# Patient Record
Sex: FEMALE | Race: WHITE | Employment: FULL TIME | URBAN - METROPOLITAN AREA
[De-identification: names, ages, dates, MRNs, and addresses within clinical notes are randomized per-mention and may not be internally consistent; named-entity substitution may affect disease eponyms.]

---

## 2017-09-11 ENCOUNTER — OFFICE VISIT (OUTPATIENT)
Dept: ORTHOPEDIC SURGERY | Age: 52
End: 2017-09-11

## 2017-09-11 VITALS
BODY MASS INDEX: 27.6 KG/M2 | WEIGHT: 150 LBS | HEIGHT: 62 IN | HEART RATE: 88 BPM | DIASTOLIC BLOOD PRESSURE: 89 MMHG | SYSTOLIC BLOOD PRESSURE: 131 MMHG

## 2017-09-11 DIAGNOSIS — S83.241A ACUTE MEDIAL MENISCUS TEAR, RIGHT, INITIAL ENCOUNTER: ICD-10-CM

## 2017-09-11 DIAGNOSIS — M25.561 ACUTE PAIN OF RIGHT KNEE: Primary | ICD-10-CM

## 2017-09-11 DIAGNOSIS — M22.41 CHONDROMALACIA PATELLA, RIGHT: ICD-10-CM

## 2017-09-11 PROCEDURE — 99213 OFFICE O/P EST LOW 20 MIN: CPT | Performed by: ORTHOPAEDIC SURGERY

## 2017-09-11 RX ORDER — DICYCLOMINE HYDROCHLORIDE 10 MG/1
10 CAPSULE ORAL
COMMUNITY
Start: 2016-06-21 | End: 2022-01-10

## 2017-09-11 RX ORDER — LORAZEPAM 0.5 MG/1
0.5 TABLET ORAL
COMMUNITY
Start: 2017-07-13 | End: 2022-01-10

## 2017-09-11 ASSESSMENT — ENCOUNTER SYMPTOMS
COUGH: 1
SINUS PRESSURE: 1
BACK PAIN: 0
SHORTNESS OF BREATH: 1
WHEEZING: 1
SORE THROAT: 1
CHEST TIGHTNESS: 1
EYES NEGATIVE: 1
GASTROINTESTINAL NEGATIVE: 1

## 2017-09-14 ENCOUNTER — OFFICE VISIT (OUTPATIENT)
Dept: ORTHOPEDIC SURGERY | Age: 52
End: 2017-09-14

## 2017-09-14 VITALS — HEART RATE: 104 BPM | SYSTOLIC BLOOD PRESSURE: 133 MMHG | DIASTOLIC BLOOD PRESSURE: 89 MMHG

## 2017-09-14 DIAGNOSIS — M25.561 ACUTE PAIN OF RIGHT KNEE: Primary | ICD-10-CM

## 2017-09-14 DIAGNOSIS — M76.31 IT BAND SYNDROME, RIGHT: ICD-10-CM

## 2017-09-14 DIAGNOSIS — M22.41 CHONDROMALACIA PATELLA, RIGHT: ICD-10-CM

## 2017-09-14 DIAGNOSIS — M70.51 PES ANSERINUS BURSITIS OF RIGHT KNEE: ICD-10-CM

## 2017-09-14 PROCEDURE — 20610 DRAIN/INJ JOINT/BURSA W/O US: CPT | Performed by: ORTHOPAEDIC SURGERY

## 2017-09-14 PROCEDURE — 99213 OFFICE O/P EST LOW 20 MIN: CPT | Performed by: ORTHOPAEDIC SURGERY

## 2019-12-31 ENCOUNTER — OFFICE VISIT (OUTPATIENT)
Dept: ORTHOPEDIC SURGERY | Age: 54
End: 2019-12-31
Payer: COMMERCIAL

## 2019-12-31 ENCOUNTER — TELEPHONE (OUTPATIENT)
Dept: ORTHOPEDIC SURGERY | Age: 54
End: 2019-12-31

## 2019-12-31 VITALS
HEART RATE: 88 BPM | SYSTOLIC BLOOD PRESSURE: 116 MMHG | DIASTOLIC BLOOD PRESSURE: 78 MMHG | WEIGHT: 150 LBS | BODY MASS INDEX: 26.58 KG/M2 | HEIGHT: 63 IN

## 2019-12-31 DIAGNOSIS — M70.52 PES ANSERINUS BURSITIS OF LEFT KNEE: ICD-10-CM

## 2019-12-31 DIAGNOSIS — M25.561 ACUTE PAIN OF RIGHT KNEE: Primary | ICD-10-CM

## 2019-12-31 DIAGNOSIS — M25.562 ACUTE PAIN OF LEFT KNEE: ICD-10-CM

## 2019-12-31 DIAGNOSIS — S83.521A SPRAIN OF POSTERIOR CRUCIATE LIGAMENT OF RIGHT KNEE, INITIAL ENCOUNTER: ICD-10-CM

## 2019-12-31 PROCEDURE — L1812 KO ELASTIC W/JOINTS PRE OTS: HCPCS | Performed by: ORTHOPAEDIC SURGERY

## 2019-12-31 PROCEDURE — 20610 DRAIN/INJ JOINT/BURSA W/O US: CPT | Performed by: ORTHOPAEDIC SURGERY

## 2019-12-31 PROCEDURE — 99214 OFFICE O/P EST MOD 30 MIN: CPT | Performed by: ORTHOPAEDIC SURGERY

## 2019-12-31 RX ORDER — METHYLPREDNISOLONE ACETATE 40 MG/ML
80 INJECTION, SUSPENSION INTRA-ARTICULAR; INTRALESIONAL; INTRAMUSCULAR; SOFT TISSUE ONCE
Status: COMPLETED | OUTPATIENT
Start: 2019-12-31 | End: 2019-12-31

## 2019-12-31 RX ORDER — LIDOCAINE HYDROCHLORIDE 10 MG/ML
1 INJECTION, SOLUTION INFILTRATION; PERINEURAL ONCE
Status: COMPLETED | OUTPATIENT
Start: 2019-12-31 | End: 2019-12-31

## 2019-12-31 RX ADMIN — LIDOCAINE HYDROCHLORIDE 1 ML: 10 INJECTION, SOLUTION INFILTRATION; PERINEURAL at 08:21

## 2019-12-31 RX ADMIN — METHYLPREDNISOLONE ACETATE 80 MG: 40 INJECTION, SUSPENSION INTRA-ARTICULAR; INTRALESIONAL; INTRAMUSCULAR; SOFT TISSUE at 08:22

## 2020-01-09 ENCOUNTER — OFFICE VISIT (OUTPATIENT)
Dept: ORTHOPEDIC SURGERY | Age: 55
End: 2020-01-09
Payer: COMMERCIAL

## 2020-01-09 VITALS
HEIGHT: 63 IN | RESPIRATION RATE: 12 BRPM | DIASTOLIC BLOOD PRESSURE: 78 MMHG | SYSTOLIC BLOOD PRESSURE: 111 MMHG | BODY MASS INDEX: 26.58 KG/M2 | WEIGHT: 150 LBS | HEART RATE: 87 BPM

## 2020-01-09 PROCEDURE — 99212 OFFICE O/P EST SF 10 MIN: CPT | Performed by: ORTHOPAEDIC SURGERY

## 2020-01-09 NOTE — PROGRESS NOTES
Gilbert John returns today to follow-up her right knee. Pain is 3 or 4 out of 10. Kneeling is very painful. The brace is helpful. Patient's medications, allergies, past medical, surgical, social and family histories were reviewed and updated as appropriate. Relevant review of systems reviewed. -General Exam:    Vitals: Blood pressure 111/78, pulse 87, resp. rate 12, height 5' 3\" (1.6 m), weight 150 lb (68 kg). Constitutional: Patient is adequately groomed with no evidence of malnutrition  Mental Status: The patient is oriented to time, place and person. The patient's mood and affect are appropriate. Right knee still has 1+ drop back with PCL testing and mild to moderate patellofemoral crepitation but no drainable effusion or collateral ligament instability. MRI right knee is reviewed. It demonstrates:  HISTORY:  Acute pain, PCL injury.       TECHNICAL FACTORS:  Long- and short-axis fat- and water-weighted images were performed.       COMPARISON:  09/12/2017.       FINDINGS:  Extensor mechanism intact.  Laterally tilted patella.  Cartilage    fibrillation/fraying patellar apex and lateral patella.       Medial meniscus intact.  Scarred MCL.       Lateral meniscus, LCL intact.       ACL intact.  High grade PCL injury.  Full thickness midsubstance tear favored over high grade    partial thickness tear.  Capsulitis.  No osseous contusion or fracture.       Small ganglion cyst anteromedial meniscus recess, larger when compared to the previous exam.       CONCLUSION:   1. High grade midsubstance PCL injury.  Full thickness nonretracted tear favored over high    grade partial thickness tear. 2. Larger ganglion anteromedial meniscus recess when compared to previous exam.   3. Mild patellofemoral arthropathy.  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 4. Capsulitis. 5. Please see above.      I reviewed these findings on the report and the images with the

## 2020-01-14 ENCOUNTER — EVALUATION (OUTPATIENT)
Dept: PHYSICAL THERAPY | Age: 55
End: 2020-01-14
Payer: COMMERCIAL

## 2020-01-14 PROCEDURE — 97161 PT EVAL LOW COMPLEX 20 MIN: CPT | Performed by: PHYSICAL THERAPIST

## 2020-01-14 PROCEDURE — 97016 VASOPNEUMATIC DEVICE THERAPY: CPT | Performed by: PHYSICAL THERAPIST

## 2020-01-14 PROCEDURE — 97110 THERAPEUTIC EXERCISES: CPT | Performed by: PHYSICAL THERAPIST

## 2020-01-14 PROCEDURE — 97112 NEUROMUSCULAR REEDUCATION: CPT | Performed by: PHYSICAL THERAPIST

## 2020-01-14 NOTE — FLOWSHEET NOTE
[facility]   Phone: [phone]    Fax: [phone]      Physical Therapy Treatment Note/ Progress Report:           Date:  2020    Patient Name:  Jessica Madrigal    :  1965  MRN: <[id number]>  Restrictions/Precautions:    Medical/Treatment Diagnosis Information:  · Diagnosis: R PCL injury/tear  ·    Insurance/Certification information:  PT Insurance Information: Humana  Physician Information:  Referring Practitioner: Dr. Carol Donis  Has the plan of care been signed (Y/N):        []  Yes  [x]  No     Date of Patient follow up with Physician: 2 months      Is this a Progress Report:     [x]  Yes  []  No        If Yes:  Date Range for reporting period:  Beginnin2020  Endin2020    Progress report will be due (10 Rx or 30 days whichever is less):        Recertification will be due (POC Duration  / 90 days whichever is less): 2020         Visit # Insurance Allowable Auth Required   1 60 []  Yes [x]  No        Functional Scale: LEFS: 29/80, 63.75   Date assessed:  2020      Latex Allergy:  [x]NO      []YES  Preferred Language for Healthcare:   [x]English       []other:      Pain level:  3/10 currently, 9-10/10 with kneeling     SUBJECTIVE:  See eval    OBJECTIVE: See eval     Flexibility L R Comment:  2020   Hamstring Mod deficit Mod deficit     Gastroc Mod deficit Mod deficit     ITB         Quad                                 ROM PROM AROM Comment:  2020     L R L R     Flexion     123° 100°*     Extension     0° -1°                                       Strength R L Comment:  2020   Quad 3+/5* 4/5*     Hamstring 3+/5* 4/5     Gastroc                                Girth:  2020 L R   Mid Patella 34 cm 36 cm   Suprapatellar 37.5 cm 39 cm         Special Test Results/Comment:  2020   Skip Negative Bilaterally   Crepitus Negative Bilaterally   Valgus Laxity Negative Bilaterally   Varus Laxity Negative Progressing: [] Met: [] Not Met: [] Adjusted    Therapist goals for Patient:   Short Term Goals: To be achieved in: 2 weeks  1. Independent in HEP and progression per patient tolerance, in order to prevent re-injury. [] Progressing: [] Met: [] Not Met: [] Adjusted  2. Patient will have a decrease in pain to facilitate improvement in movement, function, and ADLs as indicated by Functional Deficits. [] Progressing: [] Met: [] Not Met: [] Adjusted    Long Term Goals: To be achieved in: 6 weeks  1. Disability index score of 15% or less for the LEFS to assist with reaching prior level of function. [] Progressing: [] Met: [] Not Met: [] Adjusted  2. Patient will demonstrate increased R knee flexion AROM to >120° to allow for proper joint functioning as indicated by patients Functional Deficits. [] Progressing: [] Met: [] Not Met: [] Adjusted  3. Patient will demonstrate an increase in B quad and HS strength by a half grade or greater to allow for proper functional mobility as indicated by patients Functional Deficits. [] Progressing: [] Met: [] Not Met: [] Adjusted  4. Patient will return to all functional activities without increased symptoms or restriction. [] Progressing: [] Met: [] Not Met: [] Adjusted  5. Patient will be able to ascend/descend 1 flight of stairs without pain or limitation so that she may return to PLOF for all ADLs. [] Progressing: [] Met: [] Not Met: [] Adjusted      Overall Progression Towards Functional goals/ Treatment Progress Update:  [] Patient is progressing as expected towards functional goals listed. [] Progression is slowed due to complexities/Impairments listed. [] Progression has been slowed due to co-morbidities.   [x] Plan just implemented, too soon to assess goals progression <30days   [] Goals require adjustment due to lack of progress  [] Patient is not progressing as expected and requires additional follow up with physician  [] Other    Prognosis for POC: [x] Good [] Fair  [] Poor      Patient requires continued skilled intervention: [x] Yes  [] No    Treatment/Activity Tolerance:  [x] Patient able to complete treatment  [] Patient limited by fatigue  [] Patient limited by pain    [] Patient limited by other medical complications  [] Other:           PLAN: See eval  [] Continue per plan of care [] Alter current plan   [x] Plan of care initiated [] Hold pending MD visit [] Discharge      Electronically signed by:  Stephen Mondragon PT, DPT, RUDY Torres PT license: [id number]  [place] PT license: [id number]      Note: If patient does not return for scheduled/ recommended follow up visits, this note will serve as a discharge from care along with most recent update on progress.

## 2020-01-14 NOTE — PLAN OF CARE
standing.     Relevant Medical History:Hx of B knee bursitis, Hx of OA, Hx of L ankle fx/repair, HX of wrist fx  Functional Scale/Score: LEFS: 29/80, 63.75% limitation    Pain Scale: 3/10 currently, 9-10/10 with kneeling  Easing factors: rest, ibuprofen, ice  Provocative factors: prolonged sitting, prolonged standing, kneeling, ascending/descending stairs (descending is worse)     Type: []Constant   [x]Intermittent  []Radiating []Localized []other:     Numbness/Tingling: patient denies    Occupation/School: Patient is employed full time as a     Living Status/Prior Level of Function: Independent with ADLs and IADLs,   Patient has approximately 15-20 stairs    OBJECTIVE:     Flexibility L R Comment:  1/14/2020   Hamstring Mod deficit Mod deficit    Gastroc Mod deficit Mod deficit    ITB      Quad                ROM PROM AROM Comment:  1/14/2020    L R L R    Flexion   123° 100°*    Extension   0° -1°                        Strength R L Comment:  1/14/2020   Quad 3+/5* 4/5*    Hamstring 3+/5* 4/5    Gastroc                    Girth:  1/14/2020 L R   Mid Patella 34 cm 36 cm   Suprapatellar 37.5 cm 39 cm       Special Test Results/Comment:  1/14/2020   Skip Negative Bilaterally   Crepitus Negative Bilaterally   Valgus Laxity Negative Bilaterally   Varus Laxity Negative Bilaterally   Anterior Drawer Negative Bilaterally   Posterior Drawer Positive on R           Reflexes/Sensation:    []Dermatomes/Myotomes intact    []Reflexes equal and normal bilaterally   [x]Other: sensation intact to light touch    Joint mobility:    []Normal    [x]Hypo   []Hyper    Palpation: +2-3 TTP along R knee lateral joint line and posterior/medial R knee    Functional Mobility/Transfers: pain and limitation with functional walking, sitting, and ascending/descending stairs    Posture: forward flexed posture    Bandages/Dressings/Incisions: N/A    Gait: (include devices/WB status) decreased stance time on R LE, decreased gait speed    Orthopedic Special Tests: see above                       [x] Patient history, allergies, meds reviewed. Medical chart reviewed. See intake form. Review Of Systems (ROS):  [x]Performed Review of systems (Integumentary, CardioPulmonary, Neurological) by intake and observation. Intake form has been scanned into medical record. Patient has been instructed to contact their primary care physician regarding ROS issues if not already being addressed at this time.       Co-morbidities/Complexities (which will affect course of rehabilitation):   []None           Arthritic conditions   []Rheumatoid arthritis (M05.9)  [x]Osteoarthritis (M19.91)   Cardiovascular conditions   []Hypertension (I10)  []Hyperlipidemia (E78.5)  []Angina pectoris (I20)  []Atherosclerosis (I70)  []CVA Musculoskeletal conditions   []Disc pathology   []Congenital spine pathologies   []Prior surgical intervention  []Osteoporosis (M81.8)  []Osteopenia (M85.8)   Endocrine conditions   []Hypothyroid (E03.9)  []Hyperthyroid Gastrointestinal conditions   []Constipation (K14.98)   Metabolic conditions   []Morbid obesity (E66.01)  []Diabetes type 1(E10.65) or 2 (E11.65)   []Neuropathy (G60.9)     Pulmonary conditions   []Asthma (J45)  []Coughing   []COPD (J44.9)   Psychological Disorders  []Anxiety (F41.9)  []Depression (F32.9)   []Other:   [x]Other:   Hx of wrist fx, Hx of L ankle fx and repair, Hx of hysterectomy, hx of 3 c-sections       Barriers to/and or personal factors that will affect rehab potential:              []Age  []Sex    []Smoker              [x]Motivation/Lack of Motivation: Patient demonstrates high motivation                        []Co-Morbidities              []Cognitive Function, education/learning barriers              []Environmental, home barriers              []profession/work barriers  [x]past PT/medical experience: Patient has had prior skilled PT experience and demonstrates good understanding f Prognosis/Rehab Potential:      []Excellent   [x]Good    []Fair   []Poor    Tolerance of evaluation/treatment:    []Excellent   [x]Good    []Fair   []Poor    Physical Therapy Evaluation Complexity Justification  [x] A history of present problem with:  [] no personal factors and/or comorbidities that impact the plan of care;  [x]1-2 personal factors and/or comorbidities that impact the plan of care  []3 personal factors and/or comorbidities that impact the plan of care  [x] An examination of body systems using standardized tests and measures addressing any of the following: body structures and functions (impairments), activity limitations, and/or participation restrictions;:  [] a total of 1-2 or more elements   [x] a total of 3 or more elements   [] a total of 4 or more elements   [x] A clinical presentation with:  [x] stable and/or uncomplicated characteristics   [] evolving clinical presentation with changing characteristics  [] unstable and unpredictable characteristics;   [x] Clinical decision making of [x] low, [] moderate, [] high complexity using standardized patient assessment instrument and/or measurable assessment of functional outcome. [x] EVAL (LOW) 20859 (typically 20 minutes face-to-face)  [] EVAL (MOD) 12929 (typically 30 minutes face-to-face)  [] EVAL (HIGH) 82458 (typically 45 minutes face-to-face)  [] RE-EVAL     PLAN:  Frequency/Duration:  1-2 days per week for 4-6 Weeks:  Interventions:  [x]  Therapeutic exercise including: strength training, ROM, for Lower extremity and core   [x]  NMR activation and proprioception for LE, Glutes and Core   [x]  Manual therapy as indicated for LE, Hip and spine to include: Dry Needling/IASTM, STM, PROM, Gr I-IV mobilizations, manipulation.    [x] Modalities as needed that may include: thermal agents, E-stim, Biofeedback, US, iontophoresis as indicated  [x] Patient education on joint protection, postural re-education, activity modification, progression of HEP.    HEP instruction: Patient returned proper demonstration of HEP. Issued patient written program clearly stating sets/reps/sessions per day. Written program was scanned into media section of medical record. (see scanned forms)    GOALS:  Patient stated goal: able to kneel without pain and return to the gym  [] Progressing: [] Met: [] Not Met: [] Adjusted    Therapist goals for Patient:   Short Term Goals: To be achieved in: 2 weeks  1. Independent in HEP and progression per patient tolerance, in order to prevent re-injury. [] Progressing: [] Met: [] Not Met: [] Adjusted  2. Patient will have a decrease in pain to facilitate improvement in movement, function, and ADLs as indicated by Functional Deficits. [] Progressing: [] Met: [] Not Met: [] Adjusted    Long Term Goals: To be achieved in: 6 weeks  1. Disability index score of 15% or less for the LEFS to assist with reaching prior level of function. [] Progressing: [] Met: [] Not Met: [] Adjusted  2. Patient will demonstrate increased R knee flexion AROM to >120° to allow for proper joint functioning as indicated by patients Functional Deficits. [] Progressing: [] Met: [] Not Met: [] Adjusted  3. Patient will demonstrate an increase in B quad and HS strength by a half grade or greater to allow for proper functional mobility as indicated by patients Functional Deficits. [] Progressing: [] Met: [] Not Met: [] Adjusted  4. Patient will return to all functional activities without increased symptoms or restriction. [] Progressing: [] Met: [] Not Met: [] Adjusted  5. Patient will be able to ascend/descend 1 flight of stairs without pain or limitation so that she may return to PLOF for all ADLs.   [] Progressing: [] Met: [] Not Met: [] Adjusted     Electronically signed by:  Yee Harris, PT, DPT, OMT-C      [place] PT license: [id number]  [place] PT license: [id number]

## 2020-01-21 ENCOUNTER — TREATMENT (OUTPATIENT)
Dept: PHYSICAL THERAPY | Age: 55
End: 2020-01-21
Payer: COMMERCIAL

## 2020-01-21 PROCEDURE — 97110 THERAPEUTIC EXERCISES: CPT | Performed by: PHYSICAL THERAPIST

## 2020-01-21 PROCEDURE — 97530 THERAPEUTIC ACTIVITIES: CPT | Performed by: PHYSICAL THERAPIST

## 2020-01-21 PROCEDURE — 97112 NEUROMUSCULAR REEDUCATION: CPT | Performed by: PHYSICAL THERAPIST

## 2020-01-21 PROCEDURE — 97016 VASOPNEUMATIC DEVICE THERAPY: CPT | Performed by: PHYSICAL THERAPIST

## 2020-01-21 NOTE — FLOWSHEET NOTE
[facility]   Phone: [phone]    Fax: [phone]      Physical Therapy Treatment Note/ Progress Report:           Date:  2020    Patient Name:  Minerva Emery    :  1965  MRN: <[id number]>  Restrictions/Precautions:    Medical/Treatment Diagnosis Information:  · Diagnosis: R PCL injury/tear     Insurance/Certification information:  PT Insurance Information: Humana  Physician Information:  Referring Practitioner: Dr. Jono Madera  Has the plan of care been signed (Y/N):        [x]  Yes  []  No     Date of Patient follow up with Physician: 2 months      Is this a Progress Report:     []  Yes  [x]  No        If Yes:  Date Range for reporting period:  Beginnin2020  Endin2020    Progress report will be due (10 Rx or 30 days whichever is less):        Recertification will be due (POC Duration  / 90 days whichever is less): 2020         Visit # Insurance Allowable Auth Required   2 60 []  Yes [x]  No        Functional Scale: LEFS: 29/80, 63.75   Date assessed:  2020      Latex Allergy:  [x]NO      []YES  Preferred Language for Healthcare:   [x]English       []other:      Pain level:  4/10      SUBJECTIVE:  Patient reports that R knee feels tight and achy today. She states that she has noticed a slight increase in soreness with the cold over the last few days. She notes that she has noticed with wearing the brace, she has noticed some increased pain in her R hip. She notes that over the weekend she also had several instances of feeling like she had \"needles\" in the lateral side of her knee when under the sheet and in the shower.      OBJECTIVE: See eval     Flexibility L R Comment:  2020   Hamstring Mod deficit Mod deficit     Gastroc Mod deficit Mod deficit     ITB         Quad                                 ROM PROM AROM Comment:  2020     L R L R     Flexion     123° 100°*     Extension     0° -1°                         30 minutes face-to-face)  [] EVAL (HIGH) 52438 (typically 45 minutes face-to-face)  [] RE-EVAL     [x] MT(74595) x  2   [] IONTO  [x] NMR (56350) x  1   [x] VASO  [] Manual (50178) x     [] Other:  [x] TA x 1      [] Mech Traction (72753)  [] ES(attended) (80915)      [] ES (un) (69243):         ASSESSMENT:  Patient demonstrates good carry over with form for all exercises today, requiring only minimal VCs from PT. She demonstrates improving ROM of R knee, though she still reports stiffness but that it feels good to stretch it. TKE, hip abduction SLR, and calf raises added to patient's program today, requiring moderate VCs for form. Plan to progress activity per patient tolerance and MD recommendations. PT instructed patient to add abduction SLR to HEP. GOALS:  Patient stated goal: able to kneel without pain and return to the gym  [] Progressing: [] Met: [] Not Met: [] Adjusted    Therapist goals for Patient:   Short Term Goals: To be achieved in: 2 weeks  1. Independent in HEP and progression per patient tolerance, in order to prevent re-injury. [] Progressing: [] Met: [] Not Met: [] Adjusted  2. Patient will have a decrease in pain to facilitate improvement in movement, function, and ADLs as indicated by Functional Deficits. [] Progressing: [] Met: [] Not Met: [] Adjusted    Long Term Goals: To be achieved in: 6 weeks  1. Disability index score of 15% or less for the LEFS to assist with reaching prior level of function. [] Progressing: [] Met: [] Not Met: [] Adjusted  2. Patient will demonstrate increased R knee flexion AROM to >120° to allow for proper joint functioning as indicated by patients Functional Deficits. [] Progressing: [] Met: [] Not Met: [] Adjusted  3. Patient will demonstrate an increase in B quad and HS strength by a half grade or greater to allow for proper functional mobility as indicated by patients Functional Deficits. [] Progressing: [] Met: [] Not Met: [] Adjusted  4.  Patient will return to all functional activities without increased symptoms or restriction. [] Progressing: [] Met: [] Not Met: [] Adjusted  5. Patient will be able to ascend/descend 1 flight of stairs without pain or limitation so that she may return to PLOF for all ADLs. [] Progressing: [] Met: [] Not Met: [] Adjusted      Overall Progression Towards Functional goals/ Treatment Progress Update:  [] Patient is progressing as expected towards functional goals listed. [] Progression is slowed due to complexities/Impairments listed. [] Progression has been slowed due to co-morbidities. [x] Plan just implemented, too soon to assess goals progression <30days   [] Goals require adjustment due to lack of progress  [] Patient is not progressing as expected and requires additional follow up with physician  [] Other    Prognosis for POC: [x] Good [] Fair  [] Poor      Patient requires continued skilled intervention: [x] Yes  [] No    Treatment/Activity Tolerance:  [x] Patient able to complete treatment  [] Patient limited by fatigue  [] Patient limited by pain    [] Patient limited by other medical complications  [] Other:           PLAN: See eval  [x] Continue per plan of care [] Alter current plan   [] Plan of care initiated [] Hold pending MD visit [] Discharge      Electronically signed by:  Robert Clark, PT, DPT, OMT-C      [place] PT license: [id number]  [place] PT license: [id number]      Note: If patient does not return for scheduled/ recommended follow up visits, this note will serve as a discharge from care along with most recent update on progress.

## 2020-01-23 ENCOUNTER — TREATMENT (OUTPATIENT)
Dept: PHYSICAL THERAPY | Age: 55
End: 2020-01-23
Payer: COMMERCIAL

## 2020-01-23 PROCEDURE — 97112 NEUROMUSCULAR REEDUCATION: CPT | Performed by: PHYSICAL THERAPIST

## 2020-01-23 PROCEDURE — 97110 THERAPEUTIC EXERCISES: CPT | Performed by: PHYSICAL THERAPIST

## 2020-01-23 PROCEDURE — 97530 THERAPEUTIC ACTIVITIES: CPT | Performed by: PHYSICAL THERAPIST

## 2020-01-23 PROCEDURE — 97016 VASOPNEUMATIC DEVICE THERAPY: CPT | Performed by: PHYSICAL THERAPIST

## 2020-01-23 NOTE — FLOWSHEET NOTE
Results/Comment:  1/14/2020   Skip Negative Bilaterally   Crepitus Negative Bilaterally   Valgus Laxity Negative Bilaterally   Varus Laxity Negative Bilaterally   Anterior Drawer Negative Bilaterally   Posterior Drawer Positive on R          RESTRICTIONS/PRECAUTIONS: Hx of R PCL tear    Exercises/Interventions:     Therapeutic Ex (13322) Sets/sec Reps Notes/CUES   BIKE      TREADMILL            STRETCHING      Towel Pull Calf 20\" x5    Inclined Calf      Hamstrings 20\" x5    Quads      Hip Flexors      ITB      Adductors            ROM      Passive      Active      Weight Hangs      Sheet Pulls 5\" x10    Ankle Pumps      ERMI            STRENGTHENING      Quad Sets 10\" x10    Ham Sets      Hip ADD Sets BS 10\" x10    SLR Flexion 2 x10    SLR Abduction 2 x10    SLR Prone      SLR Adduction      SLR+            Clam shells 2 x10                PRE Machines      Knee Extension      Knee Flexion      Leg Press            CKC      Calf Raises 3 x10    Mini Squats      Wall Sits      Step ups Lvl 1 x10    TKE Bonilla, 3-5\" x20    Bridge 3\" x20                Manual Intervention (57753)      Patellar Mobs      Femoral Tibial mobs      STM      Scar Massage      Foam Roll            NMR re-education (56625)   CUES NEEDED/comments   Biodex Balance      Tandem Balance 20\" x3 Add NPV   SLB      Rebounder      BOSU                              Therapeutic Activity (51838)      Core Training      Swiss Fabian Net Activities      Monster Walks      TRX training                        Therapeutic Exercise and NMR EXR  [x] (49354) Provided verbal/tactile cueing for activities related to strengthening, flexibility, endurance, ROM  for improvements in scapular, scapulothoracic and UE control with self care, reaching, carrying, lifting, house/yardwork, driving/computer work.     [x] (94044) Provided verbal/tactile cueing for activities related to improving balance, coordination, kinesthetic sense, posture, motor skill, proprioception  to pain or limitation so that she may return to PLOF for all ADLs. [] Progressing: [] Met: [] Not Met: [] Adjusted      Overall Progression Towards Functional goals/ Treatment Progress Update:  [] Patient is progressing as expected towards functional goals listed. [] Progression is slowed due to complexities/Impairments listed. [] Progression has been slowed due to co-morbidities. [x] Plan just implemented, too soon to assess goals progression <30days   [] Goals require adjustment due to lack of progress  [] Patient is not progressing as expected and requires additional follow up with physician  [] Other    Prognosis for POC: [x] Good [] Fair  [] Poor      Patient requires continued skilled intervention: [x] Yes  [] No    Treatment/Activity Tolerance:  [x] Patient able to complete treatment  [] Patient limited by fatigue  [] Patient limited by pain    [] Patient limited by other medical complications  [] Other:           PLAN: See eval  [x] Continue per plan of care [] Alter current plan   [] Plan of care initiated [] Hold pending MD visit [] Discharge      Electronically signed by:  Em Hartley PT, DPT, OMT-C      [id number] [facility] PT license: [id number]  [place] PT license: [id number]      Note: If patient does not return for scheduled/ recommended follow up visits, this note will serve as a discharge from care along with most recent update on progress.

## 2020-01-28 ENCOUNTER — TREATMENT (OUTPATIENT)
Dept: PHYSICAL THERAPY | Age: 55
End: 2020-01-28
Payer: COMMERCIAL

## 2020-01-28 PROCEDURE — 97110 THERAPEUTIC EXERCISES: CPT | Performed by: PHYSICAL THERAPIST

## 2020-01-28 PROCEDURE — 97016 VASOPNEUMATIC DEVICE THERAPY: CPT | Performed by: PHYSICAL THERAPIST

## 2020-01-28 PROCEDURE — 97530 THERAPEUTIC ACTIVITIES: CPT | Performed by: PHYSICAL THERAPIST

## 2020-01-28 PROCEDURE — 97112 NEUROMUSCULAR REEDUCATION: CPT | Performed by: PHYSICAL THERAPIST

## 2020-01-28 NOTE — FLOWSHEET NOTE
[facility]   Phone: [phone]    Fax: [phone]      Physical Therapy Treatment Note/ Progress Report:           Date:  2020    Patient Name:  Penny Neri    :  1965  MRN: <[id number]>  Restrictions/Precautions:    Medical/Treatment Diagnosis Information:  · Diagnosis: R PCL injury/tear     Insurance/Certification information:  PT Insurance Information: Hum[employer]  Physician Information:  Referring Practitioner: Dr. Cinda Garcia  Has the plan of care been signed (Y/N):        [x]  Yes  []  No     Date of Patient follow up with Physician: 2 months      Is this a Progress Report:     []  Yes  [x]  No        If Yes:  Date Range for reporting period:  Beginnin2020  Endin2020    Progress report will be due (10 Rx or 30 days whichever is less):        Recertification will be due (POC Duration  / 90 days whichever is less): 2020         Visit # Insurance Allowable Auth Required   4 60 []  Yes [x]  No        Functional Scale: LEFS: 29/80, 63.75   Date assessed:  2020      Latex Allergy:  [x]NO      []YES  Preferred Language for Healthcare:   [x]English       []other:      Pain level:  2-4/10      SUBJECTIVE:  Patient reports that stairs are improving. She notes that pain has decreased with descending stairs and relying less on UE assistance. She notes that kneeling also seems to be improving with decreased pain. Patient notes that she is note feeling well this morning, as she has a sinus infection and is on antibiotics. She notes that her endurance is down slightly today.     OBJECTIVE: See eval     Flexibility L R Comment:  2020   Hamstring Mod deficit Mod deficit     Gastroc Mod deficit Mod deficit     ITB         Quad                                 ROM PROM AROM Comment:  2020     L R L R     Flexion     123° 100°*     Extension     0° -1°                                       Strength R L Comment:  2020   Quad 3+/5* 4/5*     Hamstring 3+/5* 4/5     Gastroc                                Girth:  1/14/2020 L R   Mid Patella 34 cm 36 cm   Suprapatellar 37.5 cm 39 cm         Special Test Results/Comment:  1/14/2020   Skip Negative Bilaterally   Crepitus Negative Bilaterally   Valgus Laxity Negative Bilaterally   Varus Laxity Negative Bilaterally   Anterior Drawer Negative Bilaterally   Posterior Drawer Positive on R          RESTRICTIONS/PRECAUTIONS: Hx of R PCL tear    Exercises/Interventions:     Therapeutic Ex (30697) Sets/sec Reps Notes/CUES   BIKE      TREADMILL            STRETCHING      Towel Pull Calf 20\" x5    Inclined Calf 20\" x5    Hamstrings 20\" x5    Quads      Hip Flexors      ITB      Adductors            ROM      Passive      Active      Weight Hangs      Sheet Pulls 5\" x10    Ankle Pumps      ERMI            STRENGTHENING      Quad Sets 10\" x10    Ham Sets      Hip ADD Sets BS 10\" x10    SLR Flexion 2 x10 Add wt NPV   SLR Abduction 2 x10 Add wt NPV   SLR Prone      SLR Adduction      SLR+            Clam shells 2 x10 Add resistance NPV               PRE Machines      Knee Extension      Knee Flexion      Leg Press            CKC      Calf Raises 3 x10    Mini Squats      Wall Sits      Step ups Lvl 1, 2 x10    TKE Bonilla, 3-5\" x20    Bridge 3\" x20                Manual Intervention (99229)      Patellar Mobs      Femoral Tibial mobs      STM      Scar Massage      Foam Roll            NMR re-education (34074)   CUES NEEDED/comments   Biodex Balance      Tandem Balance 20\" x3 Add NPV   SLB      Rebounder      BOSU                              Therapeutic Activity (08713)      Core Training      [employer] Activities      Monster Walks      TRX training                        Therapeutic Exercise and NMR EXR  [x] (99236) Provided verbal/tactile cueing for activities related to strengthening, flexibility, endurance, ROM  for improvements in scapular, scapulothoracic and UE control with self care, reaching, carrying,

## 2020-02-04 ENCOUNTER — TREATMENT (OUTPATIENT)
Dept: PHYSICAL THERAPY | Age: 55
End: 2020-02-04
Payer: COMMERCIAL

## 2020-02-04 PROCEDURE — 97110 THERAPEUTIC EXERCISES: CPT | Performed by: PHYSICAL THERAPIST

## 2020-02-04 PROCEDURE — 97016 VASOPNEUMATIC DEVICE THERAPY: CPT | Performed by: PHYSICAL THERAPIST

## 2020-02-04 PROCEDURE — 97530 THERAPEUTIC ACTIVITIES: CPT | Performed by: PHYSICAL THERAPIST

## 2020-02-04 PROCEDURE — 97112 NEUROMUSCULAR REEDUCATION: CPT | Performed by: PHYSICAL THERAPIST

## 2020-02-04 NOTE — FLOWSHEET NOTE
[facility]   Phone: [phone]    Fax: [phone]      Physical Therapy Treatment Note/ Progress Report:           Date:  2020    Patient Name:  Maylon Sacks    :  1965  MRN: <[id number]>  Restrictions/Precautions:    Medical/Treatment Diagnosis Information:  · Diagnosis: R PCL injury/tear     Insurance/Certification information:  PT Insurance Information: Humana  Physician Information:  Referring Practitioner: Dr. Vijaya Alfonso  Has the plan of care been signed (Y/N):        [x]  Yes  []  No     Date of Patient follow up with Physician: 2 months      Is this a Progress Report:     []  Yes  [x]  No        If Yes:  Date Range for reporting period:  Beginnin2020  Endin2020    Progress report will be due (10 Rx or 30 days whichever is less): 3/67/1381       Recertification will be due (POC Duration  / 90 days whichever is less): 2020         Visit # Insurance Allowable Auth Required   5 60 []  Yes [x]  No        Functional Scale: LEFS: 29/80, 63.75   Date assessed:  2020      Latex Allergy:  [x]NO      []YES  Preferred Language for Healthcare:   [x]English       []other:      Pain level:  2-4/10      SUBJECTIVE:  Patient reports that she had flare up in symptoms over the weekend. She notes that she was  \"pretty sick\" in the middle of last week and slept the majority of Tuesday and Wednesday. She notes that she had some minor increased pain along patellar tendon over the weekend and increased sense of giving way that she hadn't had in a while.     OBJECTIVE: See eval     Flexibility L R Comment:  2020   Hamstring Mod deficit Mod deficit     Gastroc Mod deficit Mod deficit     ITB         Quad                                 ROM PROM AROM Comment:  2020     L R L R     Flexion     123° 100°*     Extension     0° -1°                                       Strength R L Comment:  2020   Quad 3+/5* 4/5*     Hamstring 3+/5* 4/5   Gastroc                                Girth:  1/14/2020 L R   Mid Patella 34 cm 36 cm   Suprapatellar 37.5 cm 39 cm         Special Test Results/Comment:  1/14/2020   Skip Negative Bilaterally   Crepitus Negative Bilaterally   Valgus Laxity Negative Bilaterally   Varus Laxity Negative Bilaterally   Anterior Drawer Negative Bilaterally   Posterior Drawer Positive on R          RESTRICTIONS/PRECAUTIONS: Hx of R PCL tear    Exercises/Interventions:     Therapeutic Ex (96973) Sets/sec Reps Notes/CUES   BIKE      TREADMILL            STRETCHING      Towel Pull Calf 20\" x5    Inclined Calf 20\" x5    Hamstrings 20\" x5    Quads      Hip Flexors      ITB      Adductors            ROM      Passive      Active      Weight Hangs      Sheet Pulls 5\" x10    Ankle Pumps      ERMI            STRENGTHENING      Quad Sets 10\" x10    Ham Sets      Hip ADD Sets BS 10\" x10    SLR Flexion 1#, 2 x10    SLR Abduction 1#, 2 x10    SLR Prone      SLR Adduction      SLR+            Clam shells 2 x10 Add resistance NPV               PRE Machines      Knee Extension      Knee Flexion      Leg Press            CKC      Calf Raises 3 x10    Mini Squats      Wall Sits      Step ups Lvl 1, 2 x10    TKE Bonilla, 3-5\" x20    Bridge 3\" x20                Manual Intervention (64215)      Patellar Mobs      Femoral Tibial mobs      STM      Scar Massage      Foam Roll            NMR re-education (58013)   CUES NEEDED/comments   Biodex Balance      Tandem Balance 20\" x3 Add NPV   SLB      Rebounder      BOSU                              Therapeutic Activity (15796)      Core Training      Swiss [place] Activities      Monster Walks      TRX training                        Therapeutic Exercise and NMR EXR  [x] (26765) Provided verbal/tactile cueing for activities related to strengthening, flexibility, endurance, ROM  for improvements in scapular, scapulothoracic and UE control with self care, reaching, carrying, lifting, house/yardwork, driving/computer work.    [x] (49749) Provided verbal/tactile cueing for activities related to improving balance, coordination, kinesthetic sense, posture, motor skill, proprioception  to assist with  scapular, scapulothoracic and UE control with self care, reaching, carrying, lifting, house/yardwork, driving/computer work. Therapeutic Activities:    [] (97280 or 92994) Provided verbal/tactile cueing for activities related to improving balance, coordination, kinesthetic sense, posture, motor skill, proprioception and motor activation to allow for proper function of scapular, scapulothoracic and UE control with self care, carrying, lifting, driving/computer work. Home Exercise Program:    [x] (85865) Reviewed/Progressed HEP activities related to strengthening, flexibility, endurance, ROM of scapular, scapulothoracic and UE control with self care, reaching, carrying, lifting, house/yardwork, driving/computer work  [] (09653) Reviewed/Progressed HEP activities related to improving balance, coordination, kinesthetic sense, posture, motor skill, proprioception of scapular, scapulothoracic and UE control with self care, reaching, carrying, lifting, house/yardwork, driving/computer work      Manual Treatments:  PROM / STM / Oscillations-Mobs:  G-I, II, III, IV (PA's, Inf., Post.)  [] (11163) Provided manual therapy to mobilize soft tissue/joints of cervical/CT, scapular GHJ and UE for the purpose of modulating pain, promoting relaxation,  increasing ROM, reducing/eliminating soft tissue swelling/inflammation/restriction, improving soft tissue extensibility and allowing for proper ROM for normal function with self care, reaching, carrying, lifting, house/yardwork, driving/computer work    Modalities:     [x] GAME READY (VASO)- for significant edema, swelling, pain control.  To B knees x15'    Charges:  Timed Code Treatment Minutes: 56   Total Treatment Minutes: 66      [] EVAL (LOW) 77657 (typically 20 minutes face-to-face)  [] YAHAIRA (MOD) 05741 (typically 30 minutes face-to-face)  [] EVAL (HIGH) 58280 (typically 45 minutes face-to-face)  [] RE-EVAL     [x] UM(95633) x  1   [] IONTO  [x] NMR (91393) x  1   [x] VASO  [] Manual (11340) x     [] Other:  [x] TA x 2      [] Mech Traction (54190)  [] ES(attended) (21982)      [] ES (un) (38720):         ASSESSMENT:  Weight added to SLR activities today with no reports of increased pain from patient, though moderate fatigue is noted. All other progressions held today secondary to patient's report of flare up and limited activity over this past week from being sick. Plan to progress activity per patient tolerance with emphasis on quad strengthening and function. GOALS:  Patient stated goal: able to kneel without pain and return to the gym  [] Progressing: [] Met: [] Not Met: [] Adjusted    Therapist goals for Patient:   Short Term Goals: To be achieved in: 2 weeks  1. Independent in HEP and progression per patient tolerance, in order to prevent re-injury. [] Progressing: [] Met: [] Not Met: [] Adjusted  2. Patient will have a decrease in pain to facilitate improvement in movement, function, and ADLs as indicated by Functional Deficits. [] Progressing: [] Met: [] Not Met: [] Adjusted    Long Term Goals: To be achieved in: 6 weeks  1. Disability index score of 15% or less for the LEFS to assist with reaching prior level of function. [] Progressing: [] Met: [] Not Met: [] Adjusted  2. Patient will demonstrate increased R knee flexion AROM to >120° to allow for proper joint functioning as indicated by patients Functional Deficits. [] Progressing: [] Met: [] Not Met: [] Adjusted  3. Patient will demonstrate an increase in B quad and HS strength by a half grade or greater to allow for proper functional mobility as indicated by patients Functional Deficits. [] Progressing: [] Met: [] Not Met: [] Adjusted  4.  Patient will return to all functional activities without increased symptoms or

## 2020-02-06 ENCOUNTER — TREATMENT (OUTPATIENT)
Dept: PHYSICAL THERAPY | Age: 55
End: 2020-02-06
Payer: COMMERCIAL

## 2020-02-06 PROCEDURE — 97016 VASOPNEUMATIC DEVICE THERAPY: CPT | Performed by: PHYSICAL THERAPIST

## 2020-02-06 PROCEDURE — 97112 NEUROMUSCULAR REEDUCATION: CPT | Performed by: PHYSICAL THERAPIST

## 2020-02-06 PROCEDURE — 97530 THERAPEUTIC ACTIVITIES: CPT | Performed by: PHYSICAL THERAPIST

## 2020-02-06 PROCEDURE — 97110 THERAPEUTIC EXERCISES: CPT | Performed by: PHYSICAL THERAPIST

## 2020-02-06 NOTE — PROGRESS NOTES
[facility]   Phone: [phone]    Fax: [phone]      Physical Therapy Treatment Note/ Progress Report:           Date:  2020    Patient Name:  Ydaira Neely    :  1965  MRN: <[id number]>  Restrictions/Precautions:    Medical/Treatment Diagnosis Information:  · Diagnosis: R PCL injury  ·    Insurance/Certification information:  PT Insurance Information: Humana  Physician Information:  Referring Practitioner: Dr. Shreyas Whitlock  Has the plan of care been signed (Y/N):        []  Yes  [x]  No     Date of Patient follow up with Physician: 2 months        Is this a Progress Report:        []? Yes                    [x]? No          If Yes:  Date Range for reporting period:  Beginnin2020  Endin2020     Progress report will be due (10 Rx or 30 days whichever is less):          Recertification will be due (POC Duration  / 90 days whichever is less): 2020            Visit # Insurance Allowable Auth Required   6 60 []? Yes     [x]? No                Functional Scale: LEFS: 29/80, 63.75                                 Date assessed:  2020                    Latex Allergy:  [x]? NO      []? YES  Preferred Language for Healthcare:   [x]? English       []? other:        Pain level:  2-4/10            SUBJECTIVE:  Patient reports slight improvements in R knee since last session, but notes that it is still not where it was at the beginning of last week.  She notes that she tried to go without her brace yesterday morning, but continued to have feelings of instability so she put the brace back on.     OBJECTIVE: See eval     Flexibility L R Comment:  2020   Hamstring Mod deficit Mod deficit     Gastroc Mod deficit Mod deficit     ITB         Quad                                       ROM PROM AROM Comment:  2020     L R L R     Flexion     123° 100°*     Extension     0° -1°                                       Strength R L house/yardwork, driving/computer work     Modalities:     [x]? GAME READY (VASO)- for significant edema, swelling, pain control. To B knees x15'     Charges:  Timed Code Treatment Minutes: 46   Total Treatment Minutes: 66      []? EVAL (LOW) 65559 (typically 20 minutes face-to-face)  []? EVAL (MOD) 53418 (typically 30 minutes face-to-face)  []? EVAL (HIGH) 49172 (typically 45 minutes face-to-face)  []? RE-EVAL      [x]? NK(13209) x  1                   []? IONTO  [x]? NMR (86471) x  1              [x]? VASO  []? Manual (92415) x              []? Other:  [x]? TA x 1                                 []? Mech Traction (97286)  []? ES(attended) (47028)                    []? ES (un) (17888):           ASSESSMENT:  Tandem balance added to patient's program today to focus on stability of R knee. She is also able to progress to level 2 on step up activity today with no reports of increased pain in the R knee. PT and patient discussed use of brace when she will be on her feet consistently. Plan to progress strength and functional activities per patient tolerance.        GOALS:  Patient stated goal: able to kneel without pain and return to the gym  []? Progressing: []? Met: []? Not Met: []? Adjusted     Therapist goals for Patient:   Short Term Goals: To be achieved in: 2 weeks  1. Independent in HEP and progression per patient tolerance, in order to prevent re-injury. []? Progressing: []? Met: []? Not Met: []? Adjusted  2. Patient will have a decrease in pain to facilitate improvement in movement, function, and ADLs as indicated by Functional Deficits. []? Progressing: []? Met: []? Not Met: []? Adjusted     Long Term Goals: To be achieved in: 6 weeks  1. Disability index score of 15% or less for the LEFS to assist with reaching prior level of function. []? Progressing: []? Met: []? Not Met: []? Adjusted  2.  Patient will demonstrate increased R knee flexion AROM to >120° to allow for proper joint functioning as indicated by patients Functional Deficits. []? Progressing: []? Met: []? Not Met: []? Adjusted  3. Patient will demonstrate an increase in B quad and HS strength by a half grade or greater to allow for proper functional mobility as indicated by patients Functional Deficits. []? Progressing: []? Met: []? Not Met: []? Adjusted  4. Patient will return to all functional activities without increased symptoms or restriction. []? Progressing: []? Met: []? Not Met: []? Adjusted  5. Patient will be able to ascend/descend 1 flight of stairs without pain or limitation so that she may return to [facility] for all ADLs.  []? Progressing: []? Met: []? Not Met: []? Adjusted        Overall Progression Towards Functional goals/ Treatment Progress Update:  []? Patient is progressing as expected towards functional goals listed. []? Progression is slowed due to complexities/Impairments listed. []? Progression has been slowed due to co-morbidities. [x]? Plan just implemented, too soon to assess goals progression <30days   []? Goals require adjustment due to lack of progress  []? Patient is not progressing as expected and requires additional follow up with physician  []? Other     Prognosis for POC:     [x]? Good          []? Fair             []? Poor        Patient requires continued skilled intervention:         [x]? Yes             []? No     Treatment/Activity Tolerance:  [x]? Patient able to complete treatment  []? Patient limited by fatigue  []? Patient limited by pain                     []? Patient limited by other medical complications  []? Other:             PLAN: See eval  [x]? Continue per plan of care  []? Alter current plan   []? Plan of care initiated          []? Hold pending MD visit       []?  Discharge         Electronically signed by:  Miguel Angel Negron, PT, DPT, OMT-C      [id number] [facility] [employer] license: [id number]  [place] PT license: [id number]      Note: If patient does not return for scheduled/ recommended follow up visits, this note will serve as a discharge from care along with most recent update on progress.

## 2020-02-11 ENCOUNTER — TREATMENT (OUTPATIENT)
Dept: PHYSICAL THERAPY | Age: 55
End: 2020-02-11
Payer: COMMERCIAL

## 2020-02-11 ENCOUNTER — TELEPHONE (OUTPATIENT)
Dept: ORTHOPEDIC SURGERY | Age: 55
End: 2020-02-11

## 2020-02-11 PROCEDURE — 97530 THERAPEUTIC ACTIVITIES: CPT | Performed by: PHYSICAL THERAPIST

## 2020-02-11 PROCEDURE — 97110 THERAPEUTIC EXERCISES: CPT | Performed by: PHYSICAL THERAPIST

## 2020-02-11 PROCEDURE — 97112 NEUROMUSCULAR REEDUCATION: CPT | Performed by: PHYSICAL THERAPIST

## 2020-02-11 PROCEDURE — 97016 VASOPNEUMATIC DEVICE THERAPY: CPT | Performed by: PHYSICAL THERAPIST

## 2020-02-11 NOTE — TELEPHONE ENCOUNTER
Spoke with patient. Dr. Jean-Paul Shah would like to see her 2 months from last appointment. Scheduled appointment for patient to follow up 3-10-20 at 7:45.

## 2020-02-11 NOTE — PLAN OF CARE
[facility]   Phone: [phone]    Fax: [phone]     Physical Therapy Re-Certification Plan of Care    Dear Dr. Saundra Carrington,    We had the pleasure of treating the following patient for physical therapy services at [address] [place] [address]. A summary of our findings can be found in the updated assessment below. This includes our plan of care. If you have any questions or concerns regarding these findings, please do not hesitate to contact me at the office phone number checked above. Thank you for the referral.     Physician Signature:________________________________Date:__________________  By signing above (or electronic signature), therapists plan is approved by physician      Overall Response to Treatment:   [x]Patient is responding well to treatment and improvement is noted with regards  to goals   []Patient should continue to improve in reasonable time if they continue HEP   []Patient has plateaued and is no longer responding to skilled PT intervention    []Patient is getting worse and would benefit from return to referring MD   []Patient unable to adhere to initial POC   []Other:         Physical Therapy Treatment Note/ Progress Report:           Date:  2020    Patient Name:  Gilbert John    :  1965  MRN: <[id number]>  Restrictions/Precautions:    Medical/Treatment Diagnosis Information:  · Diagnosis: R PCL injury     Insurance/Certification information:  PT Insurance Information: Humana  Physician Information:  Referring Practitioner: Dr. Saundra Carrington  Has the plan of care been signed (Y/N):        []  Yes  [x]  No     Date of Patient follow up with Physician: 2 months        Is this a Progress Report:        [x]?   Yes                    []?  No          If Yes:  Date Range for reporting period:  Beginnin2020  (update NPV)  Endin2020     Progress report will be due (10 Rx or 30 days whichever is less): 3/11/2020 Ankle Pumps         ERMI                   STRENGTHENING         Quad Sets 10\" x10     Ham Sets         Hip ADD Sets BS 10\" x10     SLR Flexion 1#, 2 x10     SLR Abduction 1#, 2 x10     SLR Prone         SLR Adduction         SLR+                   Clam shells Blue, 2 x10                        PRE Machines         Knee Extension         Knee Flexion         Leg Press                   CKC         Calf Raises 3 x10     Mini Squats         Wall Sits         Step ups Lvl 3, 2 x10     Step up and over lvl 2,  x10    TKE Bonilla, 3-5\" x20     Bridge 3\" x20                         Manual Intervention (54087)         Patellar Mobs         Femoral Tibial mobs         STM         Scar Massage         Foam Roll                   NMR re-education (59853)     CUES NEEDED/comments   Biodex Balance         Tandem Balance 20\" x3 On airex   SLB         Rebounder         BOSU                                                 Therapeutic Activity (01772)         Core Training         [employer] Activities         Monster Walks         TRX training                                      Therapeutic Exercise and NMR EXR  [x]? (59791) Provided verbal/tactile cueing for activities related to strengthening, flexibility, endurance, ROM  for improvements in scapular, scapulothoracic and UE control with self care, reaching, carrying, lifting, house/yardwork, driving/computer work.     [x]? (92074) Provided verbal/tactile cueing for activities related to improving balance, coordination, kinesthetic sense, posture, motor skill, proprioception  to assist with  scapular, scapulothoracic and UE control with self care, reaching, carrying, lifting, house/yardwork, driving/computer work.     Therapeutic Activities:    []? (40482 or 96708) Provided verbal/tactile cueing for activities related to improving balance, coordination, kinesthetic sense, posture, motor skill, proprioception and motor activation to allow for proper function of scapular, scapulothoracic and UE control with self care, carrying, lifting, driving/computer work.      Home Exercise Program:    [x]? (75731) Reviewed/Progressed HEP activities related to strengthening, flexibility, endurance, ROM of scapular, scapulothoracic and UE control with self care, reaching, carrying, lifting, house/yardwork, driving/computer work  []? (54277) Reviewed/Progressed HEP activities related to improving balance, coordination, kinesthetic sense, posture, motor skill, proprioception of scapular, scapulothoracic and UE control with self care, reaching, carrying, lifting, house/yardwork, driving/computer work       Manual Treatments:  PROM / STM / Oscillations-Mobs:  G-I, II, III, IV (PA's, Inf., Post.)  []? (36439) Provided manual therapy to mobilize soft tissue/joints of cervical/CT, scapular GHJ and UE for the purpose of modulating pain, promoting relaxation,  increasing ROM, reducing/eliminating soft tissue swelling/inflammation/restriction, improving soft tissue extensibility and allowing for proper ROM for normal function with self care, reaching, carrying, lifting, house/yardwork, driving/computer work     Modalities:     [x]? GAME READY (VASO)- for significant edema, swelling, pain control. To B knees x10'     Charges:  Timed Code Treatment Minutes: 53   Total Treatment Minutes: 73      []? EVAL (LOW) 65661 (typically 20 minutes face-to-face)  []? EVAL (MOD) 57558 (typically 30 minutes face-to-face)  []? EVAL (HIGH) 79892 (typically 45 minutes face-to-face)  []? RE-EVAL      [x]? ZX(18114) x  1                   []? IONTO  [x]? NMR (59409) x  1              [x]? VASO  []? Manual (86463) x              []? Other:  [x]? TA x 2                                 []? [facility] (35908)  []? ES(attended) (49542)                    []? ES (un) (59428):           ASSESSMENT:  Patient demonstrates good progress toward all LTGs previously set by PT. She continues to exhibit mild deficits in R knee flexion.  She

## 2020-02-18 ENCOUNTER — TREATMENT (OUTPATIENT)
Dept: PHYSICAL THERAPY | Age: 55
End: 2020-02-18
Payer: COMMERCIAL

## 2020-02-18 PROCEDURE — 97016 VASOPNEUMATIC DEVICE THERAPY: CPT | Performed by: PHYSICAL THERAPIST

## 2020-02-18 PROCEDURE — 97112 NEUROMUSCULAR REEDUCATION: CPT | Performed by: PHYSICAL THERAPIST

## 2020-02-18 PROCEDURE — 97110 THERAPEUTIC EXERCISES: CPT | Performed by: PHYSICAL THERAPIST

## 2020-02-18 PROCEDURE — 97530 THERAPEUTIC ACTIVITIES: CPT | Performed by: PHYSICAL THERAPIST

## 2020-02-18 NOTE — PROGRESS NOTES
[facility]   Phone: [phone]    Fax: [phone]        Physical Therapy Treatment Note/ Progress Report:           Date:  2020    Patient Name:  Minerva Emery    :  1965  MRN: <[id number]>  Restrictions/Precautions:    Medical/Treatment Diagnosis Information:  · Diagnosis: R PCL injury     Insurance/Certification information:  PT Insurance Information: Humana  Physician Information:  Referring Practitioner: Dr. Jono Madera  Has the plan of care been signed (Y/N):        []  Yes  [x]  No     Date of Patient follow up with Physician: 2 months        Is this a Progress Report:        []? Yes                    [x]? No          If Yes:  Date Range for reporting period:  Beginnin2020    Ending: 3/14/2020     Progress report will be due (10 Rx or 30 days whichever is less):          Recertification will be due (POC Duration  / 90 days whichever is less): 3/11/2020            Visit # Insurance Allowable Auth Required   8 60 []? Yes     [x]? No                Functional Scale: LEFS: 59/80, 26.25%limitation                                 Date assessed:  2020                    Latex Allergy:  [x]? NO      []? YES  Preferred Language for Healthcare:   [x]? English       []? other:        Pain level:  -3/10            SUBJECTIVE:  Patient reports that R knee is feeling \"great\" over the last week.  She notes that she still feels the knee get tight at night when she is going to bed, but other than that, has had no pain or issues.     OBJECTIVE: See eval     Flexibility L R Comment:  2020   Hamstring Mod deficit Mod deficit     Gastroc Mod deficit Mod deficit     ITB         Quad                                       ROM PROM AROM Comment:  2020     L R L R     Flexion     130° 123°*     Extension     0° 0°                                       Strength R L Comment:  2020   Quad 4-/5 4+/5     Hamstring 4-/5* 4/5     Gastroc                                Girth:  2/11/2020 L R   Mid Patella 34 cm 36 cm   Suprapatellar 37.5 cm 38 cm         Special Test Results/Comment:  2/11/2020   Skip Negative Bilaterally   Crepitus Negative Bilaterally   Valgus Laxity Negative Bilaterally   Varus Laxity Negative Bilaterally   Anterior Drawer Negative Bilaterally   Posterior Drawer Positive on R            RESTRICTIONS/PRECAUTIONS: Hx of R PCL tear     Exercises/Interventions:      Therapeutic Ex (72604) Sets/sec Reps Notes/CUES   BIKE         TREADMILL                   STRETCHING         Towel Pull Calf 20\" x5     Inclined Calf 20\" x5     Hamstrings 20\" x5     Quads         Hip Flexors         ITB         Adductors                   ROM         Passive         Active         Weight Hangs         Sheet Pulls 5\" x10     Ankle Pumps         ERMI                   STRENGTHENING         Quad Sets 10\" x10     Ham Sets         Hip ADD Sets BS 10\" x10     SLR Flexion 2#, 2 x10     SLR Abduction 2#, 2 x10     SLR Prone         SLR Adduction         SLR+                   Clam shells Blue, 2 x10                        PRE Machines         Knee Extension         Knee Flexion         Leg Press                   CKC         Calf Raises 3 x10     Mini Squats         Wall Sits         Step ups Lvl 3, 2 x10     Step up and over lvl 3, 2 x10    TKE Bonilla, 3-5\" x20     Bridge 3\" x20                         Manual Intervention (46473)         Patellar Mobs         Femoral Tibial mobs         STM         Scar Massage         Foam Roll                   NMR re-education (66746)     CUES NEEDED/comments   Biodex Balance         Tandem Balance 20\" x3 On airex   SLB         Rebounder         BOSU                                                 Therapeutic Activity (23273)         Core Training         Swiss Ball Activities         Monster Walks         TRX training                                      Therapeutic Exercise and NMR EXR  [x]? (80201) Provided verbal/tactile cueing for (VASO)- for significant edema, swelling, pain control. To B knees x10'     Charges:  Timed Code Treatment Minutes: 61   Total Treatment Minutes: 71      []? EVAL (LOW) 73420 (typically 20 minutes face-to-face)  []? EVAL (MOD) 92790 (typically 30 minutes face-to-face)  []? EVAL (HIGH) 56677 (typically 45 minutes face-to-face)  []? RE-EVAL      [x]? IT(77882) x  1                   []? IONTO  [x]? NMR (87304) x  1              [x]? VASO  []? Manual (66409) x              []? Other:  [x]? TA x 2                                 []? Mech Traction (05282)  []? ES(attended) (26186)                    []? ES (un) (74687):           ASSESSMENT:  Patient demonstrates improving strength in B LEs with increasing weights for SLR activities and increased sets and height of step up and over activity today. She demonstrates overall improving endurance as well, requiring fewer rest breaks to complete session. PT and patient discussed completing remaining scheduled PT appointments and then proceeding to discharge to independent HEP at that time pending no new injuries or flare ups. Patient is in agreement with this plan.        GOALS:  Patient stated goal: able to kneel without pain and return to the gym  [x]? Progressing: []? Met: []? Not Met: []? Adjusted     Therapist goals for Patient:   Short Term Goals: To be achieved in: 2 weeks  1. Independent in HEP and progression per patient tolerance, in order to prevent re-injury. []? Progressing: [x]? Met: []? Not Met: []? Adjusted  2. Patient will have a decrease in pain to facilitate improvement in movement, function, and ADLs as indicated by Functional Deficits. []? Progressing: [x]? Met: []? Not Met: []? Adjusted     Long Term Goals: To be achieved in: 6 weeks  1. Disability index score of 15% or less for the LEFS to assist with reaching prior level of function. [x]? Progressing: []? Met: []? Not Met: []? Adjusted  2.  Patient will demonstrate increased R knee flexion AROM to >120° to allow for proper joint functioning as indicated by patients Functional Deficits. []? Progressing: [x]? Met: []? Not Met: []? Adjusted  3. Patient will demonstrate an increase in B quad and HS strength by a half grade or greater to allow for proper functional mobility as indicated by patients Functional Deficits. [x]? Progressing: []? Met: []? Not Met: []? Adjusted  4. Patient will return to all functional activities without increased symptoms or restriction. [x]? Progressing: []? Met: []? Not Met: []? Adjusted  5. Patient will be able to ascend/descend 1 flight of stairs without pain or limitation so that she may return to [facility] for all ADLs. [x]? Progressing: []? Met: []? Not Met: []? Adjusted        Overall Progression Towards Functional goals/ Treatment Progress Update:  [x]? Patient is progressing as expected towards functional goals listed. []? Progression is slowed due to complexities/Impairments listed. []? Progression has been slowed due to co-morbidities. []? Plan just implemented, too soon to assess goals progression <30days   []? Goals require adjustment due to lack of progress  []? Patient is not progressing as expected and requires additional follow up with physician  []? Other     Prognosis for POC:     [x]? Good          []? Fair             []? Poor        Patient requires continued skilled intervention:         [x]? Yes             []? No     Treatment/Activity Tolerance:  [x]? Patient able to complete treatment  []? Patient limited by fatigue  []? Patient limited by pain                     []? Patient limited by other medical complications  []? Other:             PLAN: 1-2x/week for 4 weeks  [x]? Continue per plan of care  []? Alter current plan   []? Plan of care initiated          []? Hold pending MD visit       []?  Discharge         Electronically signed by:  Rhonda Dong, PT, DPT, OMT-C      [place] PT license: [id number]  [place] PT license: [id number]      Note: If patient does not

## 2020-02-27 ENCOUNTER — TREATMENT (OUTPATIENT)
Dept: PHYSICAL THERAPY | Age: 55
End: 2020-02-27

## 2020-02-27 NOTE — PROGRESS NOTES
[facility]   Phone: [phone]    Fax: [phone]            Physical Therapy  Cancellation/No-show Note  Patient Name:  Rachel Miles  :  1965   Date:  2020  Cancelled visits to date: 1  No-shows to date: 0    For today's appointment patient:  [x]  Cancelled  []  Rescheduled appointment  []  No-show     Reason given by patient:  []  Patient ill  []  Conflicting appointment  []  No transportation    []  Conflict with work  [x]  No reason given  []  Other:     Comments:      Electronically signed by:  Madalyn Garcias, PT, DPT, OMT-C      [address] [facility] [employer] [address] PT license: [id number]  [place] PT license: [id number]

## 2020-02-27 NOTE — PROGRESS NOTES
[facility]   Phone: [phone]    Fax: [phone]        Physical Therapy Treatment Note/ Progress Report:           Date:  2020    Patient Name:  Penny Neri    :  1965  MRN: <[id number]>  Restrictions/Precautions:    Medical/Treatment Diagnosis Information:  · Diagnosis: R PCL injury     Insurance/Certification information:  PT Insurance Information: Humana  Physician Information:  Referring Practitioner: Dr. Cinda Garcia  Has the plan of care been signed (Y/N):        []  Yes  [x]  No     Date of Patient follow up with Physician: 3/10/2020        Is this a Progress Report:        []? Yes                    [x]? No          If Yes:  Date Range for reporting period:  Beginnin2020    Ending: 3/14/2020     Progress report will be due (10 Rx or 30 days whichever is less): 5632         Recertification will be due (POC Duration  / 90 days whichever is less): 3/11/2020            Visit # Insurance Allowable Auth Required   9 60 []? Yes     [x]? No                Functional Scale: LEFS: 59/80, 26.25%limitation                                 Date assessed:  2020                    Latex Allergy:  [x]? NO      []? YES  Preferred Language for Healthcare:   [x]? English       []? other:        Pain level:  1-3/10            SUBJECTIVE:  Patient reports ***     OBJECTIVE: See eval     Flexibility L R Comment:  2020   Hamstring Mod deficit Mod deficit     Gastroc Mod deficit Mod deficit     ITB         Quad                                       ROM PROM AROM Comment:  2020     L R L R     Flexion     130° 123°*     Extension     0° 0°                                       Strength R L Comment:  2020   Quad 4-/5 4+/5     Hamstring 4-/5* 4/5     Gastroc                                Girth:  2020 L R   Mid Patella 34 cm 36 cm   Suprapatellar 37.5 cm 38 cm         Special Test Results/Comment:  2020   Skip Negative Bilaterally (typically 20 minutes face-to-face)  []? EVAL (MOD) 06434 (typically 30 minutes face-to-face)  []? EVAL (HIGH) 25478 (typically 45 minutes face-to-face)  []? RE-EVAL      [x]? SI(85153) x  1                   []? IONTO  [x]? NMR (57362) x  1              [x]? VASO  []? Manual (60348) x              []? Other:  [x]? TA x 2                                 []? Mech Traction (90987)  []? ES(attended) (58084)                    []? ES (un) (49444):           ASSESSMENT:  ***Patient demonstrates improving strength in B LEs with increasing weights for SLR activities and increased sets and height of step up and over activity today. She demonstrates overall improving endurance as well, requiring fewer rest breaks to complete session. PT and patient discussed completing remaining scheduled PT appointments and then proceeding to discharge to independent [facility] at that time pending no new injuries or flare ups. Patient is in agreement with this plan.        GOALS:  Patient stated goal: able to kneel without pain and return to the gym  [x]? Progressing: []? Met: []? Not Met: []? Adjusted     Therapist goals for Patient:   Short Term Goals: To be achieved in: 2 weeks  1. Independent in HEP and progression per patient tolerance, in order to prevent re-injury. []? Progressing: [x]? Met: []? Not Met: []? Adjusted  2. Patient will have a decrease in pain to facilitate improvement in movement, function, and ADLs as indicated by Functional Deficits. []? Progressing: [x]? Met: []? Not Met: []? Adjusted     Long Term Goals: To be achieved in: 6 weeks  1. Disability index score of 15% or less for the LEFS to assist with reaching prior level of function. [x]? Progressing: []? Met: []? Not Met: []? Adjusted  2. Patient will demonstrate increased R knee flexion AROM to >120° to allow for proper joint functioning as indicated by patients Functional Deficits. []? Progressing: [x]? Met: []? Not Met: []? Adjusted  3.  Patient will demonstrate an increase in B quad and HS strength by a half grade or greater to allow for proper functional mobility as indicated by patients Functional Deficits. [x]? Progressing: []? Met: []? Not Met: []? Adjusted  4. Patient will return to all functional activities without increased symptoms or restriction. [x]? Progressing: []? Met: []? Not Met: []? Adjusted  5. Patient will be able to ascend/descend 1 flight of stairs without pain or limitation so that she may return to PLOF for all ADLs. [x]? Progressing: []? Met: []? Not Met: []? Adjusted        Overall Progression Towards Functional goals/ Treatment Progress Update:  [x]? Patient is progressing as expected towards functional goals listed. []? Progression is slowed due to complexities/Impairments listed. []? Progression has been slowed due to co-morbidities. []? Plan just implemented, too soon to assess goals progression <30days   []? Goals require adjustment due to lack of progress  []? Patient is not progressing as expected and requires additional follow up with physician  []? Other     Prognosis for POC:     [x]? Good          []? Fair             []? Poor        Patient requires continued skilled intervention:         [x]? Yes             []? No     Treatment/Activity Tolerance:  [x]? Patient able to complete treatment  []? Patient limited by fatigue  []? Patient limited by pain                     []? Patient limited by other medical complications  []? Other:             PLAN: 1-2x/week for 4 weeks  [x]? Continue per plan of care  []? Alter current plan   []? Plan of care initiated          []? Hold pending MD visit       []? Discharge         Electronically signed by:  Diya Day, PT, DPT, OMT-C      [id number] [facility] [employer] [address] PT license: [id number]  [place] PT license: [id number]      Note: If patient does not return for scheduled/ recommended follow up visits, this note will serve as a discharge from care along with most recent update on progress.

## 2020-03-05 ENCOUNTER — TREATMENT (OUTPATIENT)
Dept: PHYSICAL THERAPY | Age: 55
End: 2020-03-05
Payer: COMMERCIAL

## 2020-03-05 PROCEDURE — 97530 THERAPEUTIC ACTIVITIES: CPT | Performed by: PHYSICAL THERAPIST

## 2020-03-05 PROCEDURE — 97112 NEUROMUSCULAR REEDUCATION: CPT | Performed by: PHYSICAL THERAPIST

## 2020-03-05 PROCEDURE — 97110 THERAPEUTIC EXERCISES: CPT | Performed by: PHYSICAL THERAPIST

## 2020-03-05 NOTE — PROGRESS NOTES
[facility]   Phone: [phone]    Fax: [phone]      Physical Therapy Discharge Summary    Dear Dr. Andie Almanza,    We had the pleasure of treating the following patient for physical therapy services at [address] [place] [address]. A summary of our findings can be found in the discharge summary below. If you have any questions or concerns regarding these findings, please do not hesitate to contact me at the office phone number checked above. Thank you for the referral.     Physician Signature:________________________________Date:__________________  By signing above (or electronic signature), therapists plan is approved by physician      Overall Response to Treatment:   [x]Patient is responding well to treatment and improvement is noted with regards  to goals   [x]Patient should continue to improve in reasonable time if they continue HEP   []Patient has plateaued and is no longer responding to skilled PT intervention    []Patient is getting worse and would benefit from return to referring MD   []Patient unable to adhere to initial POC   []Other:     Date range of Visits: 2020-3/5/2020    Total Visits: 9        Physical Therapy Treatment Note/ Progress Report:           Date:  3/5/2020    Patient Name:  Steph Peterson    :  1965  MRN: <[id number]>  Restrictions/Precautions:    Medical/Treatment Diagnosis Information:  · Diagnosis: R PCL injury     Insurance/Certification information:  PT Insurance Information: Humana  Physician Information:  Referring Practitioner: Dr. Andie Almanza  Has the plan of care been signed (Y/N):        []  Yes  [x]  No     Date of Patient follow up with Physician: 2 months        Is this a Progress Report:        [x]?   Yes                    []?  No          If Yes:  Date Range for reporting period:  Beginnin2020    Ending: 3/14/2020     Progress report will be due (10 Rx or 30 days whichever is less):   (Patient is being discharged)        Recertification will be due (POC Duration  / 90 days whichever is less):  (Patient is being discharged)           Visit # Insurance Allowable Auth Required   9 60 []? Yes     [x]? No                Functional Scale: LEFS: 59/80, 26.25%limitation                                 Date assessed:  2/11/2020                    Latex Allergy:  [x]? NO      []? YES  Preferred Language for Healthcare:   [x]? English       []? other:        Pain level:  0-2/10            SUBJECTIVE:  Patient reports that she went hiking at [facility] this past weekend.  She notes that she has noticed some mild increased swelling in the R knee, but pain/soreness has been minimal. She notes the knee is more stiff than sore/painful.     OBJECTIVE: See eval     Flexibility L R Comment:  3/5/2020   Hamstring Mod deficit Mod deficit     Gastroc Mod deficit Mod deficit     ITB         Quad                                       ROM PROM AROM Comment:  3/5/2020     L R L R     Flexion     130° 123°     Extension     0° 0°                                       Strength R L Comment:  3/5/2020   Quad 4/5 4+/5     Hamstring 4/5* (very slight pain) 4+/5     Gastroc                                Girth:  3/5/2020 L R   Mid Patella 34 cm 35.5 cm   Suprapatellar 37 cm 38.5 cm         Special Test Results/Comment:  3/5/2020   Skip Negative Bilaterally   Crepitus Negative Bilaterally   Valgus Laxity Negative Bilaterally   Varus Laxity Negative Bilaterally   Anterior Drawer Negative Bilaterally   Posterior Drawer Positive on R            RESTRICTIONS/PRECAUTIONS: Hx of R PCL tear     Exercises/Interventions:      Therapeutic Ex (11732) Sets/sec Reps Notes/CUES   BIKE         TREADMILL                   STRETCHING         Towel Pull Calf 20\" x5     Inclined Calf 20\" x5     Hamstrings 20\" x5     Quads         Hip Flexors         ITB         Adductors                   ROM         Passive         Active         Weight Hangs         Sheet Pulls 5\" x10     Ankle Pumps         ERMI                   STRENGTHENING         Quad Sets 10\" x10     Ham Sets         Hip ADD Sets BS 10\" x10     SLR Flexion 2#, 2 x10     SLR Abduction 2#, 2 x10     SLR Prone         SLR Adduction         SLR+                   Clam shells Blue, 2 x10                        PRE Machines         Knee Extension         Knee Flexion         Leg Press                   CKC         Calf Raises 3 x10     Mini Squats         Wall Sits             Step up and over lvl 3, 2 x10    TKE Bonilla, 3-5\" x20     Bridge 3\" x20                         Manual Intervention (85181)         Patellar Mobs         Femoral Tibial mobs         STM         Scar Massage         Foam Roll                   NMR re-education (30803)     CUES NEEDED/comments   Biodex Balance         Tandem Balance 20\" x3 On airex   SLB         Rebounder         BOSU                                                 Therapeutic Activity (35689)         Core Training         Swiss Ball Activities         Monster Walks         TRX training                                      Therapeutic Exercise and NMR EXR  [x]? (85078) Provided verbal/tactile cueing for activities related to strengthening, flexibility, endurance, ROM  for improvements in scapular, scapulothoracic and UE control with self care, reaching, carrying, lifting, house/yardwork, driving/computer work.     [x]? (50656) Provided verbal/tactile cueing for activities related to improving balance, coordination, kinesthetic sense, posture, motor skill, proprioception  to assist with  scapular, scapulothoracic and UE control with self care, reaching, carrying, lifting, house/yardwork, driving/computer work.     Therapeutic Activities:    []? (96029 or 91755) Provided verbal/tactile cueing for activities related to improving balance, coordination, kinesthetic sense, posture, motor skill, proprioception and motor activation to allow for proper function of scapular, scapulothoracic and UE control with self care, carrying, lifting, driving/computer work.      Home Exercise Program:    [x]? (32409) Reviewed/Progressed HEP activities related to strengthening, flexibility, endurance, ROM of scapular, scapulothoracic and UE control with self care, reaching, carrying, lifting, house/yardwork, driving/computer work  []? (30823) Reviewed/Progressed HEP activities related to improving balance, coordination, kinesthetic sense, posture, motor skill, proprioception of scapular, scapulothoracic and UE control with self care, reaching, carrying, lifting, house/yardwork, driving/computer work       Manual Treatments:  PROM / STM / Oscillations-Mobs:  G-I, II, III, IV (PA's, Inf., Post.)  []? (33145) Provided manual therapy to mobilize soft tissue/joints of cervical/CT, scapular GHJ and UE for the purpose of modulating pain, promoting relaxation,  increasing ROM, reducing/eliminating soft tissue swelling/inflammation/restriction, improving soft tissue extensibility and allowing for proper ROM for normal function with self care, reaching, carrying, lifting, house/yardwork, driving/computer work     Modalities:     []? GAME READY (VASO)- for significant edema, swelling, pain control. To B knees x10'     Charges:  Timed Code Treatment Minutes: 60   Total Treatment Minutes: 60      []? EVAL (LOW) 35987 (typically 20 minutes face-to-face)  []? EVAL (MOD) 15106 (typically 30 minutes face-to-face)  []? EVAL (HIGH) 40057 (typically 45 minutes face-to-face)  []? RE-EVAL      [x]? TG(06530) x  1                   []? IONTO  [x]? NMR (14297) x  1              []? VASO  []? Manual (09456) x              []? Other:  [x]? TA x 2                                 []? [facility] T[facility] (96990)  []? ES(attended) (63232)                    []? ES (un) (79495):           ASSESSMENT:  Patient continues to demonstrate good progress toward LTGs previously set by PT.  She continues to have limitations in R knee flexion and mild

## 2020-06-01 ENCOUNTER — OFFICE VISIT (OUTPATIENT)
Dept: ORTHOPEDIC SURGERY | Age: 55
End: 2020-06-01
Payer: COMMERCIAL

## 2020-06-01 VITALS — BODY MASS INDEX: 26.58 KG/M2 | TEMPERATURE: 98 F | RESPIRATION RATE: 12 BRPM | HEIGHT: 63 IN | WEIGHT: 150 LBS

## 2020-06-01 PROCEDURE — 99213 OFFICE O/P EST LOW 20 MIN: CPT | Performed by: ORTHOPAEDIC SURGERY

## 2020-06-01 RX ORDER — CYCLOBENZAPRINE HCL 10 MG
10 TABLET ORAL NIGHTLY PRN
COMMUNITY
Start: 2020-03-26

## 2020-06-01 RX ORDER — MINOCYCLINE HYDROCHLORIDE 100 MG/1
CAPSULE ORAL
COMMUNITY
Start: 2020-05-15

## 2020-06-01 RX ORDER — VALACYCLOVIR HYDROCHLORIDE 1 G/1
1000 TABLET, FILM COATED ORAL
COMMUNITY
Start: 2020-03-30

## 2020-06-01 NOTE — PROGRESS NOTES
thickness tear. 2. Larger ganglion anteromedial meniscus recess when compared to previous exam.   3. Mild patellofemoral arthropathy.  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 4. Capsulitis. 5. Please see above. I reviewed these findings with the patient. 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 Plan:  I informed her that the indication for PCL reconstruction in a middle-aged relatively sedentary person is essentially 0. Recommend she continue with her rehab and use her brace as needed. If she begins to have swelling or joint line pain we can make a reevaluation. She agrees. This note was created using voice recognition software. It has been proofread, but occasionally errors remain. Please disregard these errors. They will be corrected as they are noted.

## 2022-01-10 ENCOUNTER — OFFICE VISIT (OUTPATIENT)
Dept: ORTHOPEDIC SURGERY | Age: 57
End: 2022-01-10
Payer: COMMERCIAL

## 2022-01-10 VITALS — WEIGHT: 160 LBS | HEIGHT: 63 IN | BODY MASS INDEX: 28.35 KG/M2 | RESPIRATION RATE: 12 BRPM

## 2022-01-10 DIAGNOSIS — M25.561 ACUTE PAIN OF RIGHT KNEE: Primary | ICD-10-CM

## 2022-01-10 DIAGNOSIS — M94.261 CHONDROMALACIA OF RIGHT KNEE: ICD-10-CM

## 2022-01-10 PROCEDURE — 20610 DRAIN/INJ JOINT/BURSA W/O US: CPT | Performed by: ORTHOPAEDIC SURGERY

## 2022-01-10 PROCEDURE — G8427 DOCREV CUR MEDS BY ELIG CLIN: HCPCS | Performed by: ORTHOPAEDIC SURGERY

## 2022-01-10 PROCEDURE — 99214 OFFICE O/P EST MOD 30 MIN: CPT | Performed by: ORTHOPAEDIC SURGERY

## 2022-01-10 PROCEDURE — 1036F TOBACCO NON-USER: CPT | Performed by: ORTHOPAEDIC SURGERY

## 2022-01-10 PROCEDURE — G8419 CALC BMI OUT NRM PARAM NOF/U: HCPCS | Performed by: ORTHOPAEDIC SURGERY

## 2022-01-10 PROCEDURE — G8484 FLU IMMUNIZE NO ADMIN: HCPCS | Performed by: ORTHOPAEDIC SURGERY

## 2022-01-10 PROCEDURE — 3017F COLORECTAL CA SCREEN DOC REV: CPT | Performed by: ORTHOPAEDIC SURGERY

## 2022-01-10 RX ORDER — ESTRADIOL 2 MG/1
2 TABLET ORAL DAILY
COMMUNITY
Start: 2021-09-21

## 2022-01-10 RX ORDER — METHYLPREDNISOLONE ACETATE 40 MG/ML
80 INJECTION, SUSPENSION INTRA-ARTICULAR; INTRALESIONAL; INTRAMUSCULAR; SOFT TISSUE ONCE
Status: COMPLETED | OUTPATIENT
Start: 2022-01-10 | End: 2022-01-10

## 2022-01-10 RX ORDER — LIDOCAINE HYDROCHLORIDE 10 MG/ML
4 INJECTION, SOLUTION INFILTRATION; PERINEURAL ONCE
Status: COMPLETED | OUTPATIENT
Start: 2022-01-10 | End: 2022-01-10

## 2022-01-10 RX ORDER — OMEPRAZOLE 40 MG/1
40 CAPSULE, DELAYED RELEASE ORAL DAILY
COMMUNITY
Start: 2022-01-07

## 2022-01-10 RX ORDER — ATORVASTATIN CALCIUM 40 MG/1
TABLET, FILM COATED ORAL
COMMUNITY
Start: 2022-01-07

## 2022-01-10 RX ORDER — MECLIZINE HYDROCHLORIDE 25 MG/1
25 TABLET ORAL 3 TIMES DAILY PRN
COMMUNITY
Start: 2021-03-01 | End: 2022-01-10

## 2022-01-10 RX ADMIN — LIDOCAINE HYDROCHLORIDE 4 ML: 10 INJECTION, SOLUTION INFILTRATION; PERINEURAL at 08:47

## 2022-01-10 RX ADMIN — METHYLPREDNISOLONE ACETATE 80 MG: 40 INJECTION, SUSPENSION INTRA-ARTICULAR; INTRALESIONAL; INTRAMUSCULAR; SOFT TISSUE at 08:47

## 2022-01-10 NOTE — PROGRESS NOTES
Kamila Santos is seen today for evaluation of right knee pain. She has pain in the front of the knee over the last 4 months. Feels unstable on stairs. She has been doing her exercises, taking ibuprofen, using ice, and a knee brace all without improvement. Pain ranges from 3-8 out of 10. Going downstairs is especially problematic for her. 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 She was treated with rehab and anti-inflammatory and did pretty well. She walks to work every day and that seems to be the main  of her pain. History: Patient's relevant past family, medical, and social history are reviewed as part of today's visit. ROS of pertinent positives and negatives as above; otherwise negative. General Exam:    Vitals: Resp. rate 12, height 5' 3\" (1.6 m), weight 160 lb (72.6 kg). Constitutional: Patient is adequately groomed with no evidence of malnutrition  Mental Status: The patient is oriented to time, place and person. The patient's mood and affect are appropriate. Gait:  Patient walks with normal gait and station. Lymphatic: The lymphatic examination bilaterally reveals all areas to be without enlargement or induration. Vascular: Examination reveals no swelling or calf tenderness. Peripheral pulses are palpable and 2+. Neurological: The patient has good coordination. There is no weakness or sensory deficit. Skin:    Head/Neck: inspection reveals no rashes, ulcerations or lesions. Trunk:  inspection reveals no rashes, ulcerations or lesions. Right Lower Extremity: inspection reveals no rashes, ulcerations or lesions. Left Lower Extremity: inspection reveals no rashes, ulcerations or lesions. Right knee today has no effusion. She has mild anterior crepitation but no joint line pain. Calf is soft. Posterior and anterior drawer are stable with may be 1 mm of drop back that is certainly not significant. She has full range of motion.     X-rays were obtained today in the office and interpreted by me. AP standing, PA flexed, and merchant views of the bilateral knees as well as a lateral of the right knee. These demonstrate:  Medial and patellofemoral spurring right greater than left without significant change from x-rays obtained in 2019. Assessment: Exacerbation of right knee chondromalacia. Plan: We are going to try cortisone injection today. If she has ongoing symptoms in a couple weeks I would recommend reevaluation and treatment with formal physical therapy. She agrees. Procedure: Under sterile technique, the right knee was injected into the anterolateral arthroscopy portal with 80 mg of Depo-Medrol and 40 mg of lidocaine. She tolerated that well.

## 2024-04-29 SDOH — HEALTH STABILITY: PHYSICAL HEALTH: ON AVERAGE, HOW MANY DAYS PER WEEK DO YOU ENGAGE IN MODERATE TO STRENUOUS EXERCISE (LIKE A BRISK WALK)?: 3 DAYS

## 2024-04-29 SDOH — HEALTH STABILITY: PHYSICAL HEALTH: ON AVERAGE, HOW MANY MINUTES DO YOU ENGAGE IN EXERCISE AT THIS LEVEL?: 60 MIN

## 2024-04-30 ENCOUNTER — OFFICE VISIT (OUTPATIENT)
Dept: ORTHOPEDIC SURGERY | Age: 59
End: 2024-04-30
Payer: COMMERCIAL

## 2024-04-30 VITALS — BODY MASS INDEX: 31.83 KG/M2 | WEIGHT: 173 LBS | HEIGHT: 62 IN | RESPIRATION RATE: 12 BRPM

## 2024-04-30 DIAGNOSIS — M94.261 CHONDROMALACIA, RIGHT KNEE: ICD-10-CM

## 2024-04-30 DIAGNOSIS — M25.561 ACUTE PAIN OF RIGHT KNEE: Primary | ICD-10-CM

## 2024-04-30 PROCEDURE — 20610 DRAIN/INJ JOINT/BURSA W/O US: CPT | Performed by: ORTHOPAEDIC SURGERY

## 2024-04-30 PROCEDURE — 1036F TOBACCO NON-USER: CPT | Performed by: ORTHOPAEDIC SURGERY

## 2024-04-30 PROCEDURE — G8428 CUR MEDS NOT DOCUMENT: HCPCS | Performed by: ORTHOPAEDIC SURGERY

## 2024-04-30 PROCEDURE — G8417 CALC BMI ABV UP PARAM F/U: HCPCS | Performed by: ORTHOPAEDIC SURGERY

## 2024-04-30 PROCEDURE — 99214 OFFICE O/P EST MOD 30 MIN: CPT | Performed by: ORTHOPAEDIC SURGERY

## 2024-04-30 PROCEDURE — 3017F COLORECTAL CA SCREEN DOC REV: CPT | Performed by: ORTHOPAEDIC SURGERY

## 2024-04-30 RX ORDER — PROPRANOLOL HYDROCHLORIDE 10 MG/1
10 TABLET ORAL 2 TIMES DAILY
COMMUNITY
Start: 2023-02-03

## 2024-04-30 RX ORDER — METRONIDAZOLE 7.5 MG/G
GEL TOPICAL 2 TIMES DAILY
COMMUNITY
Start: 2023-02-04

## 2024-04-30 RX ORDER — MECLIZINE HYDROCHLORIDE 25 MG/1
25 TABLET ORAL 3 TIMES DAILY PRN
COMMUNITY
Start: 2023-01-24

## 2024-04-30 RX ORDER — METHYLPREDNISOLONE ACETATE 40 MG/ML
80 INJECTION, SUSPENSION INTRA-ARTICULAR; INTRALESIONAL; INTRAMUSCULAR; SOFT TISSUE ONCE
Status: COMPLETED | OUTPATIENT
Start: 2024-04-30 | End: 2024-04-30

## 2024-04-30 RX ORDER — MOMETASONE FUROATE 1 MG/G
CREAM TOPICAL PRN
COMMUNITY
Start: 2023-06-27

## 2024-04-30 RX ORDER — CETIRIZINE HYDROCHLORIDE 10 MG/1
10 TABLET ORAL DAILY
COMMUNITY

## 2024-04-30 RX ADMIN — METHYLPREDNISOLONE ACETATE 80 MG: 40 INJECTION, SUSPENSION INTRA-ARTICULAR; INTRALESIONAL; INTRAMUSCULAR; SOFT TISSUE at 09:05

## 2024-04-30 RX ADMIN — Medication 4 ML: at 09:05

## 2024-04-30 NOTE — PROGRESS NOTES
Jacuqie Laws returns today for her right knee.  She has had increasing discomfort over the last few weeks especially with stairs and getting up from a seated position.  Pain ranges from 4-8 out of 10.    I last saw her right knee January 2022.  At that time, we treated her with a cortisone injection for exacerbation of chondromalacia.  In the past she has had cortisone injections for her right knee in 2016, 2017, and 2 injections in 2020.    She works in a bank.  She has reflux and high blood pressure as well as high cholesterol and seasonal allergies.      History: Patient's relevant past family, medical, and social history are reviewed as part of today's visit. ROS of pertinent positives and negatives as above; otherwise negative.    General Exam:    Vitals: Resp. rate 12, height 1.575 m (5' 2\"), weight 78.5 kg (173 lb).  Constitutional: Patient is adequately groomed with no evidence of malnutrition  Mental Status: The patient is oriented to time, place and person.  The patient's mood and affect are appropriate.  Gait:  Patient walks with normal gait and station.  Lymphatic: The lymphatic examination bilaterally reveals all areas to be without enlargement or induration.  Vascular: Examination reveals no swelling or calf tenderness.  Peripheral pulses are palpable and 2+.  Neurological: The patient has good coordination.  There is no weakness or sensory deficit.    Skin:    Head/Neck: inspection reveals no rashes, ulcerations or lesions.  Trunk:  inspection reveals no rashes, ulcerations or lesions.  Right Lower Extremity: inspection reveals no rashes, ulcerations or lesions.  Left Lower Extremity: inspection reveals no rashes, ulcerations or lesions.      Examination of the bilateral hips reveals normal flexion and extension.  There is no restriction in rotation.  There is no tenderness to palpation anteriorly posteriorly or laterally.    Left knee examination demonstrates no effusion.  There is no tenderness